# Patient Record
Sex: FEMALE | Race: ASIAN | Employment: FULL TIME | URBAN - METROPOLITAN AREA
[De-identification: names, ages, dates, MRNs, and addresses within clinical notes are randomized per-mention and may not be internally consistent; named-entity substitution may affect disease eponyms.]

---

## 2024-07-12 ENCOUNTER — OFFICE VISIT (OUTPATIENT)
Age: 34
End: 2024-07-12

## 2024-07-12 VITALS
WEIGHT: 138 LBS | OXYGEN SATURATION: 98 % | SYSTOLIC BLOOD PRESSURE: 114 MMHG | HEART RATE: 78 BPM | DIASTOLIC BLOOD PRESSURE: 65 MMHG | TEMPERATURE: 98.1 F

## 2024-07-12 DIAGNOSIS — H00.015 HORDEOLUM EXTERNUM OF LEFT LOWER EYELID: ICD-10-CM

## 2024-07-12 DIAGNOSIS — J02.9 PHARYNGITIS, UNSPECIFIED ETIOLOGY: Primary | ICD-10-CM

## 2024-07-12 LAB
STREP PYOGENES DNA, POC: NEGATIVE
VALID INTERNAL CONTROL, POC: YES

## 2024-07-12 RX ORDER — POLYMYXIN B SULFATE AND TRIMETHOPRIM 1; 10000 MG/ML; [USP'U]/ML
1 SOLUTION OPHTHALMIC EVERY 4 HOURS
Qty: 3 ML | Refills: 0 | Status: SHIPPED | OUTPATIENT
Start: 2024-07-12 | End: 2024-07-22

## 2024-07-12 ASSESSMENT — ENCOUNTER SYMPTOMS
DIARRHEA: 0
EYE REDNESS: 0
PHOTOPHOBIA: 0
EYE PAIN: 1
EYE DISCHARGE: 0
EYE ITCHING: 0
VOMITING: 0
COUGH: 0
SORE THROAT: 1
NAUSEA: 0

## 2024-07-12 NOTE — PATIENT INSTRUCTIONS
Thank you for visiting [facility] today.    -Wet a clean washcloth with warm water and place it on your eye for 10-15 minutes, 3 to 4 times each day, or as directed.  -Keep your hands away from your eye.  This helps to prevent the spread of infection to the other parts of the eye.    -Wash your hands often with soap and dry with a clean towel.    -Do not squeeze the stye.    Please follow up with your PCP in 2-5 days.    Go to the ER for worsening or persistent symptoms, changes in vision, severe headache with nausea or pain with eye movement.

## 2024-07-12 NOTE — PROGRESS NOTES
Subjective     Chief Complaint   Patient presents with    Pharyngitis     Left side sore throat x7 days. Stye Left lower eyelid, Left upper eyelid pain x3 days.       HPI 33-year-old female presents for sore throat going on for approximately 1 week.  No fever chills nausea vomiting.  She may use some home herbal tea symptoms persist.  She had taken some leftover amoxicillin.  She also complaining of a stye on her left lower eyelid.    History reviewed. No pertinent past medical history.    History reviewed. No pertinent surgical history.    History reviewed. No pertinent family history.    Allergies   Allergen Reactions    Levofloxacin      Racing thoughts, hyperactivity, restlessness       Social History     Tobacco Use    Smoking status: Unknown   Substance Use Topics    Alcohol use: Yes     Comment: occasional       Vitals:    07/12/24 1134   BP: 114/65   Pulse: 78   Temp: 98.1 °F (36.7 °C)   SpO2: 98%       Review of Systems   Constitutional:  Negative for chills and fever.   HENT:  Positive for sore throat. Negative for congestion.    Eyes:  Positive for pain. Negative for photophobia, discharge, redness, itching and visual disturbance.   Respiratory:  Negative for cough.    Gastrointestinal:  Negative for diarrhea, nausea and vomiting.       Objective     Physical Exam  Vitals reviewed.   Constitutional:       Appearance: Normal appearance.   HENT:      Right Ear: Tympanic membrane normal.      Left Ear: Tympanic membrane normal.      Nose: Nose normal.      Mouth/Throat:      Mouth: Mucous membranes are moist.      Pharynx: Oropharynx is clear. No oropharyngeal exudate or posterior oropharyngeal erythema.   Eyes:      Extraocular Movements: Extraocular movements intact.      Conjunctiva/sclera: Conjunctivae normal.      Pupils: Pupils are equal, round, and reactive to light.      Comments: Left lower eyelid there is some tenderness   Cardiovascular:      Rate and Rhythm: Normal rate and regular rhythm.